# Patient Record
Sex: FEMALE | Race: BLACK OR AFRICAN AMERICAN | NOT HISPANIC OR LATINO | URBAN - METROPOLITAN AREA
[De-identification: names, ages, dates, MRNs, and addresses within clinical notes are randomized per-mention and may not be internally consistent; named-entity substitution may affect disease eponyms.]

---

## 2023-11-16 ENCOUNTER — NEW PATIENT (OUTPATIENT)
Dept: URBAN - METROPOLITAN AREA CLINIC 113 | Facility: CLINIC | Age: 68
End: 2023-11-16

## 2023-11-16 DIAGNOSIS — Z79.899: ICD-10-CM

## 2023-11-16 DIAGNOSIS — H35.033: ICD-10-CM

## 2023-11-16 DIAGNOSIS — Z96.1: ICD-10-CM

## 2023-11-16 DIAGNOSIS — H43.12: ICD-10-CM

## 2023-11-16 DIAGNOSIS — M32.10: ICD-10-CM

## 2023-11-16 PROCEDURE — 99205 OFFICE O/P NEW HI 60 MIN: CPT

## 2023-11-16 PROCEDURE — 92201 OPSCPY EXTND RTA DRAW UNI/BI: CPT

## 2023-11-16 PROCEDURE — 76512 OPH US DX B-SCAN: CPT

## 2023-11-16 PROCEDURE — 92134 CPTRZ OPH DX IMG PST SGM RTA: CPT

## 2023-11-16 ASSESSMENT — VISUAL ACUITY
OS_SC: CF 1FT
OD_SC: 20/20-1

## 2023-11-16 ASSESSMENT — TONOMETRY
OS_IOP_MMHG: 10
OD_IOP_MMHG: 6

## 2023-11-29 ENCOUNTER — FOLLOW UP (OUTPATIENT)
Dept: URBAN - METROPOLITAN AREA CLINIC 2 | Facility: CLINIC | Age: 68
End: 2023-11-29

## 2023-11-29 DIAGNOSIS — H43.12: ICD-10-CM

## 2023-11-29 PROCEDURE — 76512 OPH US DX B-SCAN: CPT

## 2023-11-29 PROCEDURE — 92202 OPSCPY EXTND ON/MAC DRAW: CPT

## 2023-11-29 PROCEDURE — 92014 COMPRE OPH EXAM EST PT 1/>: CPT

## 2023-11-29 ASSESSMENT — VISUAL ACUITY: OD_SC: 20/20-2

## 2023-11-29 ASSESSMENT — TONOMETRY
OS_IOP_MMHG: 9
OD_IOP_MMHG: 9

## 2023-12-06 ENCOUNTER — SURGERY/PROCEDURE (OUTPATIENT)
Dept: URBAN - METROPOLITAN AREA SURGICAL CENTER 41 | Facility: SURGICAL CENTER | Age: 68
End: 2023-12-06

## 2023-12-06 DIAGNOSIS — H43.12: ICD-10-CM

## 2023-12-06 PROCEDURE — 67036 REMOVAL OF INNER EYE FLUID: CPT | Mod: 80,LT

## 2023-12-07 ENCOUNTER — 1 DAY POST-OP (OUTPATIENT)
Dept: URBAN - METROPOLITAN AREA CLINIC 113 | Facility: CLINIC | Age: 68
End: 2023-12-07

## 2023-12-07 DIAGNOSIS — Z98.890: ICD-10-CM

## 2023-12-07 DIAGNOSIS — H43.12: ICD-10-CM

## 2023-12-07 PROCEDURE — 99024 POSTOP FOLLOW-UP VISIT: CPT

## 2023-12-07 ASSESSMENT — TONOMETRY: OS_IOP_MMHG: 15

## 2023-12-07 ASSESSMENT — VISUAL ACUITY: OD_CC: CF 2FT

## 2023-12-15 ENCOUNTER — 1 WEEK POST-OP (OUTPATIENT)
Dept: URBAN - METROPOLITAN AREA CLINIC 2 | Facility: CLINIC | Age: 68
End: 2023-12-15

## 2023-12-15 DIAGNOSIS — H35.09: ICD-10-CM

## 2023-12-15 DIAGNOSIS — Z98.890: ICD-10-CM

## 2023-12-15 DIAGNOSIS — H35.342: ICD-10-CM

## 2023-12-15 DIAGNOSIS — H43.12: ICD-10-CM

## 2023-12-15 PROCEDURE — 92202 OPSCPY EXTND ON/MAC DRAW: CPT

## 2023-12-15 PROCEDURE — 92134 CPTRZ OPH DX IMG PST SGM RTA: CPT

## 2023-12-15 PROCEDURE — 92014 COMPRE OPH EXAM EST PT 1/>: CPT | Mod: 24

## 2023-12-15 PROCEDURE — 92235 FLUORESCEIN ANGRPH MLTIFRAME: CPT

## 2023-12-15 ASSESSMENT — VISUAL ACUITY: OS_SC: 20/60-2

## 2023-12-15 ASSESSMENT — TONOMETRY: OS_IOP_MMHG: 12

## 2023-12-20 ENCOUNTER — FOLLOW UP (OUTPATIENT)
Dept: URBAN - METROPOLITAN AREA CLINIC 2 | Facility: CLINIC | Age: 68
End: 2023-12-20

## 2023-12-20 DIAGNOSIS — H35.342: ICD-10-CM

## 2023-12-20 DIAGNOSIS — H35.09: ICD-10-CM

## 2023-12-20 PROCEDURE — J0178S EYLEA PFS SAMPLE

## 2023-12-20 PROCEDURE — 67028 INJECTION EYE DRUG: CPT

## 2023-12-20 PROCEDURE — 92014 COMPRE OPH EXAM EST PT 1/>: CPT | Mod: 24

## 2023-12-20 ASSESSMENT — TONOMETRY
OD_IOP_MMHG: 16
OS_IOP_MMHG: 18

## 2023-12-20 ASSESSMENT — VISUAL ACUITY
OS_SC: 20/80+3
OS_PH: 20/50+1
OD_SC: 20/25-1

## 2024-01-26 ENCOUNTER — SURGERY/PROCEDURE (OUTPATIENT)
Dept: URBAN - METROPOLITAN AREA SURGICAL CENTER 41 | Facility: SURGICAL CENTER | Age: 69
End: 2024-01-26

## 2024-01-26 DIAGNOSIS — H35.343: ICD-10-CM

## 2024-01-26 PROCEDURE — 67042 VIT FOR MACULAR HOLE: CPT | Mod: 79,LT

## 2024-01-27 ENCOUNTER — 1 DAY POST-OP (OUTPATIENT)
Dept: URBAN - METROPOLITAN AREA CLINIC 113 | Facility: CLINIC | Age: 69
End: 2024-01-27

## 2024-01-27 DIAGNOSIS — Z98.890: ICD-10-CM

## 2024-01-27 PROCEDURE — 99024 POSTOP FOLLOW-UP VISIT: CPT

## 2024-01-27 ASSESSMENT — TONOMETRY
OS_IOP_MMHG: 11
OD_IOP_MMHG: 10

## 2024-01-27 ASSESSMENT — VISUAL ACUITY: OD_SC: 20/20

## 2024-01-31 ENCOUNTER — 1 WEEK POST-OP (OUTPATIENT)
Dept: URBAN - METROPOLITAN AREA CLINIC 2 | Facility: CLINIC | Age: 69
End: 2024-01-31

## 2024-01-31 DIAGNOSIS — Z98.890: ICD-10-CM

## 2024-01-31 PROCEDURE — 99024 POSTOP FOLLOW-UP VISIT: CPT

## 2024-01-31 PROCEDURE — 92202 OPSCPY EXTND ON/MAC DRAW: CPT | Mod: NC

## 2024-01-31 ASSESSMENT — TONOMETRY: OS_IOP_MMHG: 13

## 2024-01-31 ASSESSMENT — VISUAL ACUITY: OS_SC: CF 3FT

## 2024-02-28 ENCOUNTER — 1 MONTH POST-OP (OUTPATIENT)
Dept: URBAN - METROPOLITAN AREA CLINIC 2 | Facility: CLINIC | Age: 69
End: 2024-02-28

## 2024-02-28 DIAGNOSIS — H35.342: ICD-10-CM

## 2024-02-28 DIAGNOSIS — Z98.890: ICD-10-CM

## 2024-02-28 PROCEDURE — 99024 POSTOP FOLLOW-UP VISIT: CPT

## 2024-02-28 PROCEDURE — 92134 CPTRZ OPH DX IMG PST SGM RTA: CPT

## 2024-02-28 ASSESSMENT — TONOMETRY: OS_IOP_MMHG: 13

## 2024-02-28 ASSESSMENT — VISUAL ACUITY
OS_SC: 20/80-3
OS_PH: 20/30-4

## 2024-10-28 ENCOUNTER — FOLLOW UP (OUTPATIENT)
Dept: URBAN - METROPOLITAN AREA CLINIC 2 | Facility: CLINIC | Age: 69
End: 2024-10-28

## 2024-10-28 DIAGNOSIS — Z96.1: ICD-10-CM

## 2024-10-28 DIAGNOSIS — Z98.890: ICD-10-CM

## 2024-10-28 DIAGNOSIS — H35.09: ICD-10-CM

## 2024-10-28 DIAGNOSIS — H35.342: ICD-10-CM

## 2024-10-28 DIAGNOSIS — H10.13: ICD-10-CM

## 2024-10-28 PROCEDURE — 92202 OPSCPY EXTND ON/MAC DRAW: CPT

## 2024-10-28 PROCEDURE — 92134 CPTRZ OPH DX IMG PST SGM RTA: CPT

## 2024-10-28 PROCEDURE — 92014 COMPRE OPH EXAM EST PT 1/>: CPT

## 2024-10-28 ASSESSMENT — TONOMETRY
OS_IOP_MMHG: 11
OD_IOP_MMHG: 14

## 2024-10-28 ASSESSMENT — VISUAL ACUITY
OS_PH: 20/30-2
OS_SC: 20/40-2
OD_SC: 20/20

## 2025-02-28 ENCOUNTER — APPOINTMENT (OUTPATIENT)
Dept: URBAN - METROPOLITAN AREA CLINIC 1 | Facility: CLINIC | Age: 70
Setting detail: DERMATOLOGY
End: 2025-02-28

## 2025-02-28 DIAGNOSIS — L21.8 OTHER SEBORRHEIC DERMATITIS: ICD-10-CM | Status: INADEQUATELY CONTROLLED

## 2025-02-28 PROCEDURE — ? PRESCRIPTION

## 2025-02-28 PROCEDURE — 99204 OFFICE O/P NEW MOD 45 MIN: CPT

## 2025-02-28 PROCEDURE — ? COUNSELING

## 2025-02-28 PROCEDURE — ? PRESCRIPTION MEDICATION MANAGEMENT

## 2025-02-28 RX ORDER — HYDROCORTISONE 25 MG/G
OINTMENT TOPICAL
Qty: 28.35 | Refills: 1 | Status: ERX | COMMUNITY
Start: 2025-02-28

## 2025-02-28 RX ADMIN — HYDROCORTISONE: 25 OINTMENT TOPICAL at 00:00

## 2025-02-28 ASSESSMENT — LOCATION SIMPLE DESCRIPTION DERM
LOCATION SIMPLE: LEFT CHEEK
LOCATION SIMPLE: RIGHT CHEEK

## 2025-02-28 ASSESSMENT — LOCATION ZONE DERM: LOCATION ZONE: FACE

## 2025-02-28 ASSESSMENT — LOCATION DETAILED DESCRIPTION DERM
LOCATION DETAILED: LEFT INFERIOR MEDIAL MALAR CHEEK
LOCATION DETAILED: RIGHT INFERIOR CENTRAL MALAR CHEEK

## 2025-02-28 NOTE — PROCEDURE: PRESCRIPTION MEDICATION MANAGEMENT
Detail Level: Zone
Render In Strict Bullet Format?: No
Initiate Treatment: hydrocortisone 2.5 % topical ointment : Apply to face QD after moisturizer 3 days on 3 days of

## 2025-02-28 NOTE — HPI: DRY SKIN
Is This A New Presentation Or A Follow-Up?: Dry Skin
How Severe Is Your Dry Skin?: severe
Additional History: Washing with just water\\nMoisturizer burn pts skin

## (undated) RX ORDER — OFLOXACIN 3 MG/ML
1 SOLUTION OPHTHALMIC
Start: 2023-11-16

## (undated) RX ORDER — PREDNISOLONE ACETATE 10 MG/ML
1 SUSPENSION/ DROPS OPHTHALMIC
Start: 2023-11-16

## (undated) RX ORDER — OFLOXACIN 3 MG/ML: 1 SOLUTION OPHTHALMIC